# Patient Record
Sex: MALE | Race: WHITE
[De-identification: names, ages, dates, MRNs, and addresses within clinical notes are randomized per-mention and may not be internally consistent; named-entity substitution may affect disease eponyms.]

---

## 2018-02-26 ENCOUNTER — HOSPITAL ENCOUNTER (EMERGENCY)
Dept: HOSPITAL 17 - NEPK | Age: 48
Discharge: HOME | End: 2018-02-26
Payer: COMMERCIAL

## 2018-02-26 VITALS
HEART RATE: 83 BPM | OXYGEN SATURATION: 100 % | SYSTOLIC BLOOD PRESSURE: 137 MMHG | RESPIRATION RATE: 14 BRPM | TEMPERATURE: 98.5 F | DIASTOLIC BLOOD PRESSURE: 93 MMHG

## 2018-02-26 DIAGNOSIS — T16.2XXA: Primary | ICD-10-CM

## 2018-02-26 PROCEDURE — 99283 EMERGENCY DEPT VISIT LOW MDM: CPT

## 2018-02-26 NOTE — PD
HPI


Chief Complaint:  Foreign Body


Time Seen by Provider:  13:37


Travel History


International Travel<30 days:  No


Contact w/Intl Traveler<30days:  No


Traveled to known affect area:  No





History of Present Illness


HPI


47-year-old male presents for evaluation of possible foreign body in the left 

ear.  He reports that yesterday he was cleaning his left ear canal with a Q-tip 

and the cotton portion apparently fell off inside of his ear canal.  He now has 

foreign body sensation and irritation in his left ear, mild, aggravated by 

using a Q-tip.  He tried to wash his left ear out with oil prior to arrival 

with no success.  He has no other complaints.





[facility]


Past Medical History


Medical History:  Denies Significant Hx


Immunizations Current:  Yes





Past Surgical History


Surgical History:  No Previous Surgery





Social History


Alcohol Use:  Yes (rare)


Tobacco Use:  No


Substance Use:  No





Allergies-Medications


(Allergen,Severity, Reaction):  


Coded Allergies:  


     No Known Allergies (Unverified , 2/26/18)


Reported Meds & Prescriptions





Reported Meds & Active Scripts


Active


No Active Prescriptions or Reported Medications    








Review of Systems


General / Constitutional:  No: Fever, Chills


HENT:  Positive: Earache, Other (foreign body sensation left ear)





Physical Exam


Narrative


GENERAL: Well-nourished male in no acute distress


SKIN: Warm and dry.


HEAD: Atraumatic. Normocephalic. 


EYES: Pupils equal and round. No scleral icterus. No injection or drainage. 


ENT: No nasal bleeding or discharge.  Mucous membranes pink and moist.  

Examination of left ear reveals that the skin is oily however there is no 

visible foreign body.  The tympanic membrane is visible but no cotton is 

initially seen.


NECK: Trachea midline. No JVD. 


CARDIOVASCULAR: Regular rate and rhythm.  No murmur appreciated.


RESPIRATORY: No accessory muscle use. Clear to auscultation. Breath sounds 

equal bilaterally.





Data


Data


Last Documented VS





Vital Signs








  Date Time  Temp Pulse Resp B/P (MAP) Pulse Ox O2 Delivery O2 Flow Rate FiO2


 


2/26/18 12:39 98.5 83 14 137/93 (108) 100   








Orders





 Orders


Ear Irrigation (2/26/18 13:42)


Ed Discharge Order (2/26/18 14:20)








MDM


Medical Decision Making


Medical Screen Exam Complete:  Yes


Emergency Medical Condition:  Yes


Medical Record Reviewed:  Yes


Differential Diagnosis


Retained foreign body, otitis externa, ruptured tympanic membrane


Narrative Course


By history the patient has cotton which has been doused with oil in his left 

ear canal.  It is not currently visible and therefore it would be impossible to 

grasp without year forceps.  Therefore ear irrigation will be attempted.





Ear irrigation was performed multiple times and small flakes of cerumen were 

removed however there is still no evidence of any cotton.  The plan will be to 

discharge the patient with ofloxacin otic solution and have him follow-up with 

an ENT specialist in 1-2 weeks.  He is agreeable.





Diagnosis





 Primary Impression:  


 Foreign body in left ear


Referrals:  


Karri Gauthier MD





***Additional Instructions:  


Medication as prescribed.  Avoid getting water in left ear.  Follow-up with an 

ENT specialist such as Dr. Gauthier in 1-2 weeks.  Return for any emergent 

medical conditions.


***Med/Other Pt SpecificInfo:  Prescription(s) given


Scripts


Ofloxacin Otic (Floxin Otic) 0.3 % Sol


10 DROP LEFT EAR DAILY for Infection for 7 Days, #1 BOTTLE 0 Refills


   Prov: Tutu Briggs MD         2/26/18


Disposition:  01 DISCHARGE HOME


Condition:  Stable











Jacob Edgar Feb 26, 2018 13:44

## 2018-05-19 ENCOUNTER — HOSPITAL ENCOUNTER (EMERGENCY)
Dept: HOSPITAL 17 - NEPC | Age: 48
Discharge: HOME | End: 2018-05-19
Payer: COMMERCIAL

## 2018-05-19 VITALS
HEART RATE: 89 BPM | OXYGEN SATURATION: 92 % | SYSTOLIC BLOOD PRESSURE: 141 MMHG | RESPIRATION RATE: 16 BRPM | DIASTOLIC BLOOD PRESSURE: 80 MMHG | TEMPERATURE: 98.7 F

## 2018-05-19 VITALS — RESPIRATION RATE: 16 BRPM

## 2018-05-19 VITALS — WEIGHT: 200.42 LBS | BODY MASS INDEX: 32.21 KG/M2 | HEIGHT: 66 IN

## 2018-05-19 DIAGNOSIS — S09.90XA: Primary | ICD-10-CM

## 2018-05-19 DIAGNOSIS — R10.30: ICD-10-CM
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
## 2018-05-19 DIAGNOSIS — M54.2: ICD-10-CM

## 2018-05-19 LAB
BASOPHILS # BLD AUTO: 0 TH/MM3 (ref 0–0.2)
BASOPHILS NFR BLD: 0.2 % (ref 0–2)
BUN SERPL-MCNC: 22 MG/DL (ref 7–18)
CALCIUM SERPL-MCNC: 9 MG/DL (ref 8.5–10.1)
CHLORIDE SERPL-SCNC: 103 MEQ/L (ref 98–107)
CREAT SERPL-MCNC: 1.12 MG/DL (ref 0.6–1.3)
EOSINOPHIL # BLD: 0.1 TH/MM3 (ref 0–0.4)
EOSINOPHIL NFR BLD: 0.7 % (ref 0–4)
ERYTHROCYTE [DISTWIDTH] IN BLOOD BY AUTOMATED COUNT: 14.7 % (ref 11.6–17.2)
GFR SERPLBLD BASED ON 1.73 SQ M-ARVRAT: 70 ML/MIN (ref 89–?)
GLUCOSE SERPL-MCNC: 107 MG/DL (ref 74–106)
HCO3 BLD-SCNC: 26 MEQ/L (ref 21–32)
HCT VFR BLD CALC: 43.1 % (ref 39–51)
HGB BLD-MCNC: 14.9 GM/DL (ref 13–17)
LYMPHOCYTES # BLD AUTO: 1.5 TH/MM3 (ref 1–4.8)
LYMPHOCYTES NFR BLD AUTO: 12.2 % (ref 9–44)
MCH RBC QN AUTO: 28.1 PG (ref 27–34)
MCHC RBC AUTO-ENTMCNC: 34.5 % (ref 32–36)
MCV RBC AUTO: 81.4 FL (ref 80–100)
MONOCYTE #: 0.8 TH/MM3 (ref 0–0.9)
MONOCYTES NFR BLD: 6.9 % (ref 0–8)
NEUTROPHILS # BLD AUTO: 9.6 TH/MM3 (ref 1.8–7.7)
NEUTROPHILS NFR BLD AUTO: 80 % (ref 16–70)
PLATELET # BLD: 240 TH/MM3 (ref 150–450)
PMV BLD AUTO: 7.9 FL (ref 7–11)
RBC # BLD AUTO: 5.3 MIL/MM3 (ref 4.5–5.9)
SODIUM SERPL-SCNC: 138 MEQ/L (ref 136–145)
WBC # BLD AUTO: 12 TH/MM3 (ref 4–11)

## 2018-05-19 PROCEDURE — 74177 CT ABD & PELVIS W/CONTRAST: CPT

## 2018-05-19 PROCEDURE — 72125 CT NECK SPINE W/O DYE: CPT

## 2018-05-19 PROCEDURE — 70450 CT HEAD/BRAIN W/O DYE: CPT

## 2018-05-19 PROCEDURE — 80048 BASIC METABOLIC PNL TOTAL CA: CPT

## 2018-05-19 PROCEDURE — 85025 COMPLETE CBC W/AUTO DIFF WBC: CPT

## 2018-05-19 PROCEDURE — 99285 EMERGENCY DEPT VISIT HI MDM: CPT

## 2018-05-19 PROCEDURE — 80307 DRUG TEST PRSMV CHEM ANLYZR: CPT

## 2018-05-19 PROCEDURE — 71260 CT THORAX DX C+: CPT
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
## 2018-05-28 ENCOUNTER — HOSPITAL ENCOUNTER (EMERGENCY)
Dept: HOSPITAL 17 - NEPD | Age: 48
Discharge: HOME | End: 2018-05-28
Payer: COMMERCIAL

## 2018-05-28 VITALS
DIASTOLIC BLOOD PRESSURE: 85 MMHG | HEART RATE: 85 BPM | TEMPERATURE: 98.2 F | OXYGEN SATURATION: 100 % | SYSTOLIC BLOOD PRESSURE: 137 MMHG | RESPIRATION RATE: 16 BRPM

## 2018-05-28 DIAGNOSIS — S06.0X9D: Primary | ICD-10-CM
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
## 2018-05-28 PROCEDURE — 70450 CT HEAD/BRAIN W/O DYE: CPT
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
